# Patient Record
Sex: MALE | Race: BLACK OR AFRICAN AMERICAN | Employment: OTHER | URBAN - METROPOLITAN AREA
[De-identification: names, ages, dates, MRNs, and addresses within clinical notes are randomized per-mention and may not be internally consistent; named-entity substitution may affect disease eponyms.]

---

## 2021-06-12 ENCOUNTER — HOSPITAL ENCOUNTER (EMERGENCY)
Age: 61
Discharge: HOME | End: 2021-06-12
Attending: STUDENT IN AN ORGANIZED HEALTH CARE EDUCATION/TRAINING PROGRAM
Payer: MEDICARE

## 2021-06-12 VITALS
SYSTOLIC BLOOD PRESSURE: 137 MMHG | WEIGHT: 190 LBS | HEART RATE: 98 BPM | BODY MASS INDEX: 25.18 KG/M2 | OXYGEN SATURATION: 100 % | TEMPERATURE: 98.6 F | DIASTOLIC BLOOD PRESSURE: 91 MMHG | RESPIRATION RATE: 18 BRPM | HEIGHT: 73 IN

## 2021-06-12 DIAGNOSIS — F10.11 HISTORY OF ALCOHOL ABUSE: Primary | ICD-10-CM

## 2021-06-12 LAB
ALBUMIN SERPL-MCNC: 4.5 G/DL (ref 3.4–5)
ALP BLD-CCNC: 82 U/L (ref 40–129)
ALT SERPL-CCNC: 24 U/L (ref 10–40)
AMORPHOUS: ABNORMAL /HPF
ANION GAP SERPL CALCULATED.3IONS-SCNC: 14 MMOL/L (ref 3–16)
AST SERPL-CCNC: 52 U/L (ref 15–37)
BACTERIA: ABNORMAL /HPF
BASOPHILS ABSOLUTE: 0.1 K/UL (ref 0–0.2)
BASOPHILS RELATIVE PERCENT: 1.2 %
BILIRUB SERPL-MCNC: 0.5 MG/DL (ref 0–1)
BILIRUBIN DIRECT: <0.2 MG/DL (ref 0–0.3)
BILIRUBIN URINE: NEGATIVE
BILIRUBIN, INDIRECT: ABNORMAL MG/DL (ref 0–1)
BLOOD, URINE: NEGATIVE
BUN BLDV-MCNC: 4 MG/DL (ref 7–20)
CALCIUM SERPL-MCNC: 9.3 MG/DL (ref 8.3–10.6)
CHLORIDE BLD-SCNC: 95 MMOL/L (ref 99–110)
CLARITY: CLEAR
CO2: 24 MMOL/L (ref 21–32)
COLOR: YELLOW
CREAT SERPL-MCNC: 0.8 MG/DL (ref 0.8–1.3)
EKG ATRIAL RATE: 97 BPM
EKG DIAGNOSIS: NORMAL
EKG P AXIS: 67 DEGREES
EKG P-R INTERVAL: 202 MS
EKG Q-T INTERVAL: 328 MS
EKG QRS DURATION: 70 MS
EKG QTC CALCULATION (BAZETT): 416 MS
EKG R AXIS: 52 DEGREES
EKG T AXIS: 63 DEGREES
EKG VENTRICULAR RATE: 97 BPM
EOSINOPHILS ABSOLUTE: 0.2 K/UL (ref 0–0.6)
EOSINOPHILS RELATIVE PERCENT: 3.2 %
EPITHELIAL CELLS, UA: ABNORMAL /HPF (ref 0–5)
ETHANOL: 15 MG/DL (ref 0–0.08)
GFR AFRICAN AMERICAN: >60
GFR NON-AFRICAN AMERICAN: >60
GLUCOSE BLD-MCNC: 106 MG/DL (ref 70–99)
GLUCOSE URINE: NEGATIVE MG/DL
HCT VFR BLD CALC: 41.9 % (ref 40.5–52.5)
HEMOGLOBIN: 14.1 G/DL (ref 13.5–17.5)
KETONES, URINE: ABNORMAL MG/DL
LEUKOCYTE ESTERASE, URINE: NEGATIVE
LYMPHOCYTES ABSOLUTE: 1.9 K/UL (ref 1–5.1)
LYMPHOCYTES RELATIVE PERCENT: 35.2 %
MCH RBC QN AUTO: 37.2 PG (ref 26–34)
MCHC RBC AUTO-ENTMCNC: 33.8 G/DL (ref 31–36)
MCV RBC AUTO: 110.1 FL (ref 80–100)
MICROSCOPIC EXAMINATION: YES
MONOCYTES ABSOLUTE: 0.6 K/UL (ref 0–1.3)
MONOCYTES RELATIVE PERCENT: 11.9 %
NEUTROPHILS ABSOLUTE: 2.6 K/UL (ref 1.7–7.7)
NEUTROPHILS RELATIVE PERCENT: 48.5 %
NITRITE, URINE: NEGATIVE
PDW BLD-RTO: 14.8 % (ref 12.4–15.4)
PH UA: 5.5 (ref 5–8)
PLATELET # BLD: 240 K/UL (ref 135–450)
PMV BLD AUTO: 7.1 FL (ref 5–10.5)
POTASSIUM REFLEX MAGNESIUM: 4.3 MMOL/L (ref 3.5–5.1)
PRO-BNP: 6 PG/ML (ref 0–124)
PROTEIN UA: ABNORMAL MG/DL
RBC # BLD: 3.8 M/UL (ref 4.2–5.9)
RBC UA: ABNORMAL /HPF (ref 0–4)
SODIUM BLD-SCNC: 133 MMOL/L (ref 136–145)
SPECIFIC GRAVITY UA: 1.02 (ref 1–1.03)
TOTAL PROTEIN: 8.2 G/DL (ref 6.4–8.2)
TROPONIN: <0.01 NG/ML
URINE TYPE: ABNORMAL
UROBILINOGEN, URINE: 1 E.U./DL
WBC # BLD: 5.3 K/UL (ref 4–11)
WBC UA: ABNORMAL /HPF (ref 0–5)

## 2021-06-12 PROCEDURE — 82077 ASSAY SPEC XCP UR&BREATH IA: CPT

## 2021-06-12 PROCEDURE — 85025 COMPLETE CBC W/AUTO DIFF WBC: CPT

## 2021-06-12 PROCEDURE — 93005 ELECTROCARDIOGRAM TRACING: CPT | Performed by: PHYSICIAN ASSISTANT

## 2021-06-12 PROCEDURE — 6370000000 HC RX 637 (ALT 250 FOR IP): Performed by: PHYSICIAN ASSISTANT

## 2021-06-12 PROCEDURE — 83880 ASSAY OF NATRIURETIC PEPTIDE: CPT

## 2021-06-12 PROCEDURE — 84484 ASSAY OF TROPONIN QUANT: CPT

## 2021-06-12 PROCEDURE — 99284 EMERGENCY DEPT VISIT MOD MDM: CPT

## 2021-06-12 PROCEDURE — 2580000003 HC RX 258: Performed by: PHYSICIAN ASSISTANT

## 2021-06-12 PROCEDURE — 80076 HEPATIC FUNCTION PANEL: CPT

## 2021-06-12 PROCEDURE — 81001 URINALYSIS AUTO W/SCOPE: CPT

## 2021-06-12 PROCEDURE — 80048 BASIC METABOLIC PNL TOTAL CA: CPT

## 2021-06-12 RX ORDER — 0.9 % SODIUM CHLORIDE 0.9 %
1000 INTRAVENOUS SOLUTION INTRAVENOUS ONCE
Status: COMPLETED | OUTPATIENT
Start: 2021-06-12 | End: 2021-06-12

## 2021-06-12 RX ORDER — M-VIT,TX,IRON,MINS/CALC/FOLIC 27MG-0.4MG
1 TABLET ORAL ONCE
Status: COMPLETED | OUTPATIENT
Start: 2021-06-12 | End: 2021-06-12

## 2021-06-12 RX ADMIN — SODIUM CHLORIDE 1000 ML: 0.9 INJECTION, SOLUTION INTRAVENOUS at 10:30

## 2021-06-12 RX ADMIN — Medication 1 TABLET: at 11:13

## 2021-06-12 ASSESSMENT — ENCOUNTER SYMPTOMS
SHORTNESS OF BREATH: 0
NAUSEA: 0
DIARRHEA: 0
ABDOMINAL PAIN: 0
CONSTIPATION: 0
VOMITING: 0
BACK PAIN: 0
CHEST TIGHTNESS: 0
COLOR CHANGE: 0

## 2021-06-12 NOTE — ED PROVIDER NOTES
ED Attending Attestation Note     Date of evaluation: 6/12/2021    This patient was seen by the advance practice provider. I have seen and examined the patient, agree with the workup, evaluation, management and diagnosis. The care plan has been discussed. I have reviewed the ECG and concur with the FEROZ's interpretation. My assessment reveals 44-year-old male with a history of daily alcohol use for several years and a history of low sodium who presents with increased fatigue as reported by family members. Patient has no complaints and states that if it were not for the urging of his family he would not have presented to the emergency department, does not appear to be in acute withdrawal, vital signs are reassuring. We will plan to obtain basic laboratory work to assess electrolytes and continue to closely monitor patient. Patient's laboratory work notable for a sodium of 133, patient received IV fluids while in the emergency department and reports improvements, at this time I feel he is safe for discharge home with outpatient follow-up.      Yeny Williamson MD  06/12/21 1107

## 2021-06-12 NOTE — ED PROVIDER NOTES
confusion and hallucinations. The patient is not nervous/anxious. Past Medical, Surgical, Family, and Social History     He has a past medical history of Alcohol abuse and Disc degeneration, lumbar. He has no past surgical history on file. His family history is not on file. He reports that he has been smoking. He has never used smokeless tobacco. He reports current alcohol use. He reports that he does not use drugs. Medications     Discharge Medication List as of 6/12/2021 11:54 AM      CONTINUE these medications which have NOT CHANGED    Details   traMADol (ULTRAM) 50 MG tablet Take 1 tablet by mouth 4 times daily as needed for Pain., Disp-24 tablet, R-0Print      sildenafil (VIAGRA) 100 MG tablet Take 0.5 tablets by mouth as needed for Erectile Dysfunction. , Disp-6 tablet, R-2             Allergies     He has No Known Allergies. Physical Exam     INITIAL VITALS: BP: (!) 137/91, Temp: 98.6 °F (37 °C), Pulse: 98, Resp: 18, SpO2: 100 %  Physical Exam  Vitals and nursing note reviewed. Constitutional:       Appearance: Normal appearance. HENT:      Head: Normocephalic. Eyes:      Extraocular Movements: Extraocular movements intact. Pupils: Pupils are equal, round, and reactive to light. Cardiovascular:      Rate and Rhythm: Normal rate and regular rhythm. Pulses: Normal pulses. Heart sounds: Normal heart sounds. No murmur heard. No friction rub. No gallop. Pulmonary:      Effort: Pulmonary effort is normal. No respiratory distress. Breath sounds: Normal breath sounds. Abdominal:      Tenderness: There is no abdominal tenderness. Musculoskeletal:         General: Normal range of motion. Right lower leg: No edema. Left lower leg: No edema. Skin:     General: Skin is warm. Coloration: Skin is not jaundiced. Neurological:      General: No focal deficit present. Mental Status: He is alert.    Psychiatric:         Mood and Affect: Mood normal. Behavior: Behavior normal.         Diagnostic Results     EKG   Interpreted in conjunction with emergency department physician Kiah Petit MD  Rhythm: normal sinus   Rate: normal  Axis: normal  Ectopy: none  Conduction: normal  ST Segments: normal, J-point elevation in leads V2 through V4, no reciprocal changes  T Waves: normal  Q Waves:none  Clinical Impression: Normal sinus rhythm, no previous for comparison    RADIOLOGY:  No orders to display     LABS:   Results for orders placed or performed during the hospital encounter of 06/12/21   CBC Auto Differential   Result Value Ref Range    WBC 5.3 4.0 - 11.0 K/uL    RBC 3.80 (L) 4.20 - 5.90 M/uL    Hemoglobin 14.1 13.5 - 17.5 g/dL    Hematocrit 41.9 40.5 - 52.5 %    .1 (H) 80.0 - 100.0 fL    MCH 37.2 (H) 26.0 - 34.0 pg    MCHC 33.8 31.0 - 36.0 g/dL    RDW 14.8 12.4 - 15.4 %    Platelets 570 701 - 352 K/uL    MPV 7.1 5.0 - 10.5 fL    Neutrophils % 48.5 %    Lymphocytes % 35.2 %    Monocytes % 11.9 %    Eosinophils % 3.2 %    Basophils % 1.2 %    Neutrophils Absolute 2.6 1.7 - 7.7 K/uL    Lymphocytes Absolute 1.9 1.0 - 5.1 K/uL    Monocytes Absolute 0.6 0.0 - 1.3 K/uL    Eosinophils Absolute 0.2 0.0 - 0.6 K/uL    Basophils Absolute 0.1 0.0 - 0.2 K/uL   Basic Metabolic Panel w/ Reflex to MG   Result Value Ref Range    Sodium 133 (L) 136 - 145 mmol/L    Potassium reflex Magnesium 4.3 3.5 - 5.1 mmol/L    Chloride 95 (L) 99 - 110 mmol/L    CO2 24 21 - 32 mmol/L    Anion Gap 14 3 - 16    Glucose 106 (H) 70 - 99 mg/dL    BUN 4 (L) 7 - 20 mg/dL    CREATININE 0.8 0.8 - 1.3 mg/dL    GFR Non-African American >60 >60    GFR African American >60 >60    Calcium 9.3 8.3 - 10.6 mg/dL   Troponin   Result Value Ref Range    Troponin <0.01 <0.01 ng/mL   Brain Natriuretic Peptide   Result Value Ref Range    Pro-BNP 6 0 - 124 pg/mL   Urinalysis, reflex to microscopic   Result Value Ref Range    Color, UA Yellow Straw/Yellow    Clarity, UA Clear Clear    Glucose, Ur Negative Negative mg/dL    Bilirubin Urine Negative Negative    Ketones, Urine TRACE (A) Negative mg/dL    Specific Gravity, UA 1.025 1.005 - 1.030    Blood, Urine Negative Negative    pH, UA 5.5 5.0 - 8.0    Protein, UA TRACE (A) Negative mg/dL    Urobilinogen, Urine 1.0 <2.0 E.U./dL    Nitrite, Urine Negative Negative    Leukocyte Esterase, Urine Negative Negative    Microscopic Examination YES     Urine Type NotGiven    Hepatic Function Panel   Result Value Ref Range    Total Protein 8.2 6.4 - 8.2 g/dL    Albumin 4.5 3.4 - 5.0 g/dL    Alkaline Phosphatase 82 40 - 129 U/L    ALT 24 10 - 40 U/L    AST 52 (H) 15 - 37 U/L    Total Bilirubin 0.5 0.0 - 1.0 mg/dL    Bilirubin, Direct <0.2 0.0 - 0.3 mg/dL    Bilirubin, Indirect see below 0.0 - 1.0 mg/dL   Ethanol   Result Value Ref Range    Ethanol Lvl 15 mg/dL   Microscopic Urinalysis   Result Value Ref Range    WBC, UA 0-2 0 - 5 /HPF    RBC, UA 0-2 0 - 4 /HPF    Epithelial Cells, UA 0-1 0 - 5 /HPF    Bacteria, UA 3+ (A) None Seen /HPF    Amorphous, UA 2+ /HPF   EKG 12 Lead   Result Value Ref Range    Ventricular Rate 97 BPM    Atrial Rate 97 BPM    P-R Interval 202 ms    QRS Duration 70 ms    Q-T Interval 328 ms    QTc Calculation (Bazett) 416 ms    P Axis 67 degrees    R Axis 52 degrees    T Axis 63 degrees    Diagnosis       EKG performed in ER and to be interpreted by ER physician. Confirmed by MD, ER ([id number]),  Shannan Rosenberg ([phone]) on 6/12/2021 10:33:06 AM       ED BEDSIDE ULTRASOUND:  None    RECENT VITALS:  BP: (!) 137/91, Temp: 98.6 °F (37 °C), Pulse: 98, Resp: 18, SpO2: 100 %     Procedures   None    ED Course     Nursing Notes, Past Medical Hx,Past Surgical Hx, Social Hx, Allergies, and Family Hx were reviewed.     The patient was given the following medications:  Orders Placed This Encounter   Medications    0.9 % sodium chloride bolus    DISCONTD: sodium chloride 0.9 % 50 mL with folic acid 1 mg, thiamine 100 mg    therapeutic multivitamin-minerals 1 tablet CONSULTS:  None    MEDICAL DECISION MAKING / ASSESSMENT / PLAN     Tu Hilton is a 64 y.o. male presenting to the emergency department to have his electrolytes checked. He has a history of hyponatremia secondary to alcohol abuse. He drinks about 12 beers as well as 1/5 of liquor a day. Last drink was shortly prior to presenting to the ED. He had no evidence of alcohol withdrawal during our evaluation. Vitals were stable, he was afebrile, with no evidence of clinical intoxication. Family members were concerned that he has been more tired lately but he has been asymptomatic. Renal panel showed a sodium of 133. He was given a 1 L fluid bolus of normal saline. He is otherwise taking p.o., and drink apple juice while in the emergency department. He was given an oral rally pack as well. Electrolytes otherwise unremarkable on renal panel. He denies any chest pain, but based off of reported concerns from family members, cardiac work-up was also initiated including EKG, single troponin as well as BNP. EKG showed normal sinus rhythm with J-point elevation in V2 through V4, no acute changes to indicate ischemia or infarction. Single troponin was normal, he does not clinically appear fluid overloaded with a normal proBNP. Denies any reported infections, fevers or chills, with no leukocytosis, normal urinalysis, no recent viral illness, cough, nasal congestions or exposure to COVID-19 during the current pandemic. At this time, felt that patient was appropriate for discharge. He does not want any help for his alcohol abuse, but was given resources for outpatient management if he does change his mind. He understands that if he chooses to stop drinking alcohol in the future than he should do this under medical supervision. Patient is in agreement with this plan and understand strict return precautions and follow-up instructions. This patient was also evaluated by the attending physician.

## 2021-11-23 ENCOUNTER — APPOINTMENT (OUTPATIENT)
Dept: GENERAL RADIOLOGY | Age: 61
End: 2021-11-23
Payer: MEDICARE

## 2021-11-23 ENCOUNTER — APPOINTMENT (OUTPATIENT)
Dept: CT IMAGING | Age: 61
End: 2021-11-23
Payer: MEDICARE

## 2021-11-23 ENCOUNTER — HOSPITAL ENCOUNTER (EMERGENCY)
Age: 61
Discharge: HOME | End: 2021-11-23
Attending: EMERGENCY MEDICINE
Payer: MEDICARE

## 2021-11-23 VITALS
DIASTOLIC BLOOD PRESSURE: 96 MMHG | OXYGEN SATURATION: 100 % | TEMPERATURE: 97.9 F | HEART RATE: 89 BPM | RESPIRATION RATE: 20 BRPM | SYSTOLIC BLOOD PRESSURE: 145 MMHG

## 2021-11-23 DIAGNOSIS — R19.09 SWELLING OF INGUINAL REGION: ICD-10-CM

## 2021-11-23 DIAGNOSIS — S20.213A BILATERAL CONTUSION OF RIBS: Primary | ICD-10-CM

## 2021-11-23 LAB
ANION GAP SERPL CALCULATED.3IONS-SCNC: 14 MMOL/L (ref 3–16)
BASOPHILS ABSOLUTE: 0 K/UL (ref 0–0.2)
BASOPHILS RELATIVE PERCENT: 0.9 %
BUN BLDV-MCNC: 2 MG/DL (ref 7–20)
CALCIUM SERPL-MCNC: 9.2 MG/DL (ref 8.3–10.6)
CHLORIDE BLD-SCNC: 98 MMOL/L (ref 99–110)
CO2: 24 MMOL/L (ref 21–32)
CREAT SERPL-MCNC: 0.7 MG/DL (ref 0.8–1.3)
EOSINOPHILS ABSOLUTE: 0.1 K/UL (ref 0–0.6)
EOSINOPHILS RELATIVE PERCENT: 3.3 %
GFR AFRICAN AMERICAN: >60
GFR NON-AFRICAN AMERICAN: >60
GLUCOSE BLD-MCNC: 89 MG/DL (ref 70–99)
HCT VFR BLD CALC: 36.9 % (ref 40.5–52.5)
HEMOGLOBIN: 12.7 G/DL (ref 13.5–17.5)
LYMPHOCYTES ABSOLUTE: 1.5 K/UL (ref 1–5.1)
LYMPHOCYTES RELATIVE PERCENT: 42.1 %
MACROCYTES: ABNORMAL
MCH RBC QN AUTO: 38 PG (ref 26–34)
MCHC RBC AUTO-ENTMCNC: 34.3 G/DL (ref 31–36)
MCV RBC AUTO: 110.8 FL (ref 80–100)
MONOCYTES ABSOLUTE: 0.5 K/UL (ref 0–1.3)
MONOCYTES RELATIVE PERCENT: 15.1 %
NEUTROPHILS ABSOLUTE: 1.3 K/UL (ref 1.7–7.7)
NEUTROPHILS RELATIVE PERCENT: 38.6 %
PDW BLD-RTO: 16.7 % (ref 12.4–15.4)
PLATELET # BLD: 243 K/UL (ref 135–450)
PMV BLD AUTO: 7.2 FL (ref 5–10.5)
POLYCHROMASIA: ABNORMAL
POTASSIUM REFLEX MAGNESIUM: 4.1 MMOL/L (ref 3.5–5.1)
RBC # BLD: 3.33 M/UL (ref 4.2–5.9)
SLIDE REVIEW: ABNORMAL
SODIUM BLD-SCNC: 136 MMOL/L (ref 136–145)
TEAR DROP CELLS: ABNORMAL
WBC # BLD: 3.5 K/UL (ref 4–11)

## 2021-11-23 PROCEDURE — 85025 COMPLETE CBC W/AUTO DIFF WBC: CPT

## 2021-11-23 PROCEDURE — 80048 BASIC METABOLIC PNL TOTAL CA: CPT

## 2021-11-23 PROCEDURE — 71046 X-RAY EXAM CHEST 2 VIEWS: CPT

## 2021-11-23 PROCEDURE — 6360000004 HC RX CONTRAST MEDICATION: Performed by: EMERGENCY MEDICINE

## 2021-11-23 PROCEDURE — 74177 CT ABD & PELVIS W/CONTRAST: CPT

## 2021-11-23 PROCEDURE — 99283 EMERGENCY DEPT VISIT LOW MDM: CPT

## 2021-11-23 RX ADMIN — IOPAMIDOL 80 ML: 755 INJECTION, SOLUTION INTRAVENOUS at 15:44

## 2021-11-23 ASSESSMENT — ENCOUNTER SYMPTOMS
ABDOMINAL PAIN: 0
VOMITING: 0
NAUSEA: 0
EYE REDNESS: 0
SHORTNESS OF BREATH: 0

## 2021-11-23 NOTE — ED PROVIDER NOTES
ED Attending Attestation Note     Date of evaluation: 11/23/2021    This patient was seen by the advance practice provider. I have seen and examined the patient, agree with the workup, evaluation, management and diagnosis. The care plan has been discussed. My assessment reveals a 77-year-old male who presents with chief complaint of abscess. Patient states he fell a few days ago and has some pain in his bilateral lower ribs and also a bump in his groin. Patient on exam with benign abdomen, breathing comfortably, normal heart and lungs,. On groin exam patient with firm swelling noted medial to the left inguinal region superior to the shaft of the penis. On ultrasound it is unclear if this is an abscess versus hematoma. CAT scan ordered.      Rafaela Birmingham MD  11/23/21 1395

## 2021-11-23 NOTE — ED PROVIDER NOTES
[address] ENCOUNTER          PHYSICIAN ASSISTANT NOTE       Date of evaluation: 11/23/2021    Chief Complaint     Abscess (pt states, \"I have a bump on my groin and my left side hurts from me fallin 4 or 5 days ago. \")      History of Present Illness     Luiz Pagan is a 64 y.o. male with PMH of HTN, alcohol abuse, MANNING who presents with groin swelling and pain. Patient reports that he fell in the dark in his basement 2 weeks ago. He states that he fell onto his chest and struck his head, caused a small cut to his right eyebrow that has since healed. He states that since then, he has had pain to bilateral ribs as well as noticed a swelling in his left groin region. This area is also painful. No pain to abdomen, testicles or scrotum. He denies associated fevers, chills, shortness of breath, nausea or vomiting, urinary or bowel changes. No hx of abscess to area. Review of Systems     Review of Systems   Constitutional: Negative for chills and fever. HENT: Negative for congestion. Eyes: Negative for redness. Respiratory: Negative for shortness of breath. Cardiovascular: Positive for chest pain (bilateral rib pain). Gastrointestinal: Negative for abdominal pain, nausea and vomiting. Genitourinary: Negative for difficulty urinating. Musculoskeletal: Negative for gait problem. Skin: Negative for wound. Neurological: Negative for dizziness. Psychiatric/Behavioral: The patient is not nervous/anxious. Past Medical, Surgical, Family, and Social History     He has a past medical history of Alcohol abuse and Disc degeneration, lumbar. He has no past surgical history on file. His family history is not on file. He reports that he has been smoking cigarettes. He has never used smokeless tobacco. He reports current alcohol use. He reports that he does not use drugs.     Medications     Current Discharge Medication List      CONTINUE these medications which have NOT CHANGED    Details   traMADol (ULTRAM) 50 MG tablet Take 1 tablet by mouth 4 times daily as needed for Pain. Qty: 24 tablet, Refills: 0    Associated Diagnoses: Lumbar disc disease      sildenafil (VIAGRA) 100 MG tablet Take 0.5 tablets by mouth as needed for Erectile Dysfunction. Qty: 6 tablet, Refills: 2    Associated Diagnoses: ED (erectile dysfunction)             Allergies     He has No Known Allergies. Physical Exam     INITIAL VITALS: BP: (!) 145/96,Temp: 97.9 °F (36.6 °C), Pulse: 89, Resp: 20, SpO2: 100 %   Physical Exam  Vitals and nursing note reviewed. Constitutional:       General: He is not in acute distress. HENT:      Head: Normocephalic and atraumatic. Mouth/Throat:      Mouth: Mucous membranes are moist.   Eyes:      Extraocular Movements: Extraocular movements intact. Conjunctiva/sclera: Conjunctivae normal.   Cardiovascular:      Rate and Rhythm: Normal rate and regular rhythm. Pulmonary:      Effort: Pulmonary effort is normal. No respiratory distress. Breath sounds: Normal breath sounds. No wheezing, rhonchi or rales. Comments: Tenderness on palpation of the bilateral lateral ribs. No step off deformity. No overlying ecchymosis or crepitus. Chest:      Chest wall: Tenderness present. Abdominal:      General: Bowel sounds are normal. There is no distension. Palpations: Abdomen is soft. Tenderness: There is no abdominal tenderness. There is no guarding or rebound. Genitourinary:     Penis: Circumcised. No discharge. Testes:         Right: Tenderness or swelling not present. Right testis is descended. Left: Tenderness or swelling not present. Left testis is descended. Musculoskeletal:         General: No deformity. Cervical back: Neck supple. Skin:     General: Skin is warm and dry. Neurological:      Mental Status: He is alert and oriented to person, place, and time.    Psychiatric:         Mood and Affect: Mood normal.         Behavior: Behavior normal.         Diagnostic Results     RADIOLOGY:  CT ABDOMEN PELVIS W IV CONTRAST Additional Contrast? None   Final Result      1. There are 2 masses in the upper aspect of the left scrotal sac, both of which are slightly hyperdense. Given the history of trauma these could reflect hematomas but are indeterminate. Would recommend a follow-up scrotal ultrasound in 4-6 weeks to    assess evolution of these abnormalities. 2. Steatosis liver. XR CHEST (2 VW)   Final Result      1.  NO ACUTE CARDIOPULMONARY DISEASE          LABS:   Results for orders placed or performed during the hospital encounter of 11/23/21   CBC Auto Differential   Result Value Ref Range    WBC 3.5 (L) 4.0 - 11.0 K/uL    RBC 3.33 (L) 4.20 - 5.90 M/uL    Hemoglobin 12.7 (L) 13.5 - 17.5 g/dL    Hematocrit 36.9 (L) 40.5 - 52.5 %    .8 (H) 80.0 - 100.0 fL    MCH 38.0 (H) 26.0 - 34.0 pg    MCHC 34.3 31.0 - 36.0 g/dL    RDW 16.7 (H) 12.4 - 15.4 %    Platelets 583 004 - 778 K/uL    MPV 7.2 5.0 - 10.5 fL    SLIDE REVIEW see below     Neutrophils % 38.6 %    Lymphocytes % 42.1 %    Monocytes % 15.1 %    Eosinophils % 3.3 %    Basophils % 0.9 %    Neutrophils Absolute 1.3 (L) 1.7 - 7.7 K/uL    Lymphocytes Absolute 1.5 1.0 - 5.1 K/uL    Monocytes Absolute 0.5 0.0 - 1.3 K/uL    Eosinophils Absolute 0.1 0.0 - 0.6 K/uL    Basophils Absolute 0.0 0.0 - 0.2 K/uL    Macrocytes Occasional (A)     Polychromasia Occasional (A)     Tear Drop Cells Occasional (A)    Basic Metabolic Panel w/ Reflex to MG   Result Value Ref Range    Sodium 136 136 - 145 mmol/L    Potassium reflex Magnesium 4.1 3.5 - 5.1 mmol/L    Chloride 98 (L) 99 - 110 mmol/L    CO2 24 21 - 32 mmol/L    Anion Gap 14 3 - 16    Glucose 89 70 - 99 mg/dL    BUN 2 (L) 7 - 20 mg/dL    CREATININE 0.7 (L) 0.8 - 1.3 mg/dL    GFR Non-African American >60 >60    GFR African American >60 >60    Calcium 9.2 8.3 - 10.6 mg/dL       RECENT VITALS:  BP: (!) 145/96, Temp: 97.9 °F (36.6 °C), Pulse: 89,Resp: 20, SpO2: 100 %     Procedures       ED Course     Nursing Notes, Past Medical Hx, Past Surgical Hx, Social Hx, Allergies, and Family Hx were reviewed. The patient was given the followingmedications:  Orders Placed This Encounter   Medications    iopamidol (ISOVUE-370) 76 % injection 80 mL       CONSULTS:  None    MEDICAL DECISION MAKING / ASSESSMENT / Taylor Kendrick is a 64 y.o. male with PMH of HTN, alcohol abuse, MANNING who presents with groin swelling. Patient states that he had a mechanical fall 2 weeks ago, landing on his chest and striking his head. He denies LOC. He states that since then he has noticed swelling to his left groin region. This area is painful. It has not increased in size however. He denies any fevers or chills, nausea or vomiting. He does complain of some pain to bilateral ribs from the fall. On exam, he is hemodynamically stable, alert and oriented without focal neurologic deficits. Heart rhythm and rate are regular. Lungs are clear to auscultation. Tenderness on palpation of bilateral lateral ribs without step-off, ecchymosis or crepitus. Abdomen soft and nontender. Testicles are descended bilaterally without tenderness or swelling. There is an area of induration without fluctuance or overlying erythema to the left inguinal region. CXR without acute findings. Bedside ultrasound was performed by my attending provider but it was unclear if this represents an abscess vs hematoma. We will plan to obtain CT in further evaluation. Labs revealed stable hemoglobin, creatinine 0.7. CT revealed 2 masses in the upper aspect of the left scrotal sac, both of which are slightly hyperdense. Given the history of trauma, these could reflect hematomas but are indeterminate. As testicles are easily palpable without tenderness, I do not feel that scrotal ultrasound is indicated emergently.   These may represent hematomas as patient reports they did appear after he fell. No signs of infection or abscess. We will plan to have him  follow-up with Urology as an outpatient. He is stable for discharge although this time but with strict return precautions. Patient was agreeable and understanding to this plan of care. Prior to discharge, patient was ambulatory and PO tolerant. This patient was also evaluated by the attending physician. All care plans were discussed and agreed upon. Clinical Impression     1. Bilateral contusion of ribs    2. Swelling of inguinal region        Disposition     PATIENT REFERRED TO:  [facility]  [id number] [facility] [address]  [address] [facility] [address]  [phone]  Schedule an appointment as soon as possible for a visit       Wesley Closs, MD  [address] Emergency Department   [address]  [address] [facility] [address]  [phone]    If symptoms worsen      DISCHARGE MEDICATIONS:  Current Discharge Medication List          DISPOSITION  Discharge.        Rosalba Levy PA-C  11/23/21 1523

## 2025-07-11 ENCOUNTER — APPOINTMENT (OUTPATIENT)
Dept: GENERAL RADIOLOGY | Age: 65
End: 2025-07-11
Payer: MEDICARE

## 2025-07-11 ENCOUNTER — APPOINTMENT (OUTPATIENT)
Dept: CT IMAGING | Age: 65
End: 2025-07-11
Payer: MEDICARE

## 2025-07-11 ENCOUNTER — HOSPITAL ENCOUNTER (EMERGENCY)
Age: 65
Discharge: HOME | End: 2025-07-11
Attending: EMERGENCY MEDICINE
Payer: MEDICARE

## 2025-07-11 VITALS
BODY MASS INDEX: 20.48 KG/M2 | WEIGHT: 154.5 LBS | OXYGEN SATURATION: 100 % | SYSTOLIC BLOOD PRESSURE: 130 MMHG | HEART RATE: 95 BPM | TEMPERATURE: 98.5 F | DIASTOLIC BLOOD PRESSURE: 83 MMHG | HEIGHT: 73 IN | RESPIRATION RATE: 11 BRPM
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
## 2025-07-11 DIAGNOSIS — M25.511 ACUTE PAIN OF RIGHT SHOULDER: ICD-10-CM

## 2025-07-11 DIAGNOSIS — S01.81XA FACIAL LACERATION, INITIAL ENCOUNTER: Primary | ICD-10-CM

## 2025-07-11 LAB
ANION GAP SERPL CALCULATED.3IONS-SCNC: 12 MMOL/L (ref 3–16)
BASOPHILS # BLD: 0 K/UL (ref 0–0.2)
BASOPHILS NFR BLD: 0.4 %
BUN SERPL-MCNC: 3 MG/DL (ref 7–20)
CALCIUM SERPL-MCNC: 8.4 MG/DL (ref 8.3–10.6)
CHLORIDE SERPL-SCNC: 103 MMOL/L (ref 99–110)
CO2 SERPL-SCNC: 21 MMOL/L (ref 21–32)
CREAT SERPL-MCNC: 0.6 MG/DL (ref 0.8–1.3)
DEPRECATED RDW RBC AUTO: 20.2 % (ref 12.4–15.4)
EOSINOPHIL # BLD: 0.1 K/UL (ref 0–0.6)
EOSINOPHIL NFR BLD: 2.8 %
GFR SERPLBLD CREATININE-BSD FMLA CKD-EPI: >90 ML/MIN/{1.73_M2}
GLUCOSE SERPL-MCNC: 102 MG/DL (ref 70–99)
HCT VFR BLD AUTO: 33.6 % (ref 40.5–52.5)
HGB BLD-MCNC: 11.8 G/DL (ref 13.5–17.5)
LYMPHOCYTES # BLD: 1.9 K/UL (ref 1–5.1)
LYMPHOCYTES NFR BLD: 46.3 %
MCH RBC QN AUTO: 38 PG (ref 26–34)
MCHC RBC AUTO-ENTMCNC: 35 G/DL (ref 31–36)
MCV RBC AUTO: 108.5 FL (ref 80–100)
MONOCYTES # BLD: 0.4 K/UL (ref 0–1.3)
MONOCYTES NFR BLD: 9.8 %
NEUTROPHILS # BLD: 1.6 K/UL (ref 1.7–7.7)
NEUTROPHILS NFR BLD: 40.7 %
PLATELET # BLD AUTO: 258 K/UL (ref 135–450)
PMV BLD AUTO: 6.9 FL (ref 5–10.5)
POTASSIUM SERPL-SCNC: 3.8 MMOL/L (ref 3.5–5.1)
RBC # BLD AUTO: 3.09 M/UL (ref 4.2–5.9)
SODIUM SERPL-SCNC: 136 MMOL/L (ref 136–145)
WBC # BLD AUTO: 4 K/UL (ref 4–11)

## 2025-07-11 PROCEDURE — 85025 COMPLETE CBC W/AUTO DIFF WBC: CPT

## 2025-07-11 PROCEDURE — 72125 CT NECK SPINE W/O DYE: CPT

## 2025-07-11 PROCEDURE — 6360000002 HC RX W HCPCS: Performed by: EMERGENCY MEDICINE

## 2025-07-11 PROCEDURE — 70450 CT HEAD/BRAIN W/O DYE: CPT

## 2025-07-11 PROCEDURE — 73030 X-RAY EXAM OF SHOULDER: CPT

## 2025-07-11 PROCEDURE — 90715 TDAP VACCINE 7 YRS/> IM: CPT | Performed by: EMERGENCY MEDICINE

## 2025-07-11 PROCEDURE — 70486 CT MAXILLOFACIAL W/O DYE: CPT

## 2025-07-11 PROCEDURE — 96374 THER/PROPH/DIAG INJ IV PUSH: CPT

## 2025-07-11 PROCEDURE — 2580000003 HC RX 258: Performed by: EMERGENCY MEDICINE

## 2025-07-11 PROCEDURE — 99284 EMERGENCY DEPT VISIT MOD MDM: CPT

## 2025-07-11 PROCEDURE — 96361 HYDRATE IV INFUSION ADD-ON: CPT

## 2025-07-11 PROCEDURE — 12013 RPR F/E/E/N/L/M 2.6-5.0 CM: CPT

## 2025-07-11 PROCEDURE — 90471 IMMUNIZATION ADMIN: CPT | Performed by: EMERGENCY MEDICINE

## 2025-07-11 PROCEDURE — 80048 BASIC METABOLIC PNL TOTAL CA: CPT

## 2025-07-11 RX ORDER — KETOROLAC TROMETHAMINE 30 MG/ML
15 INJECTION, SOLUTION INTRAMUSCULAR; INTRAVENOUS ONCE
Status: COMPLETED | OUTPATIENT
Start: 2025-07-11 | End: 2025-07-11

## 2025-07-11 RX ORDER — LIDOCAINE HYDROCHLORIDE AND EPINEPHRINE 10; 10 MG/ML; UG/ML
20 INJECTION, SOLUTION INFILTRATION; PERINEURAL ONCE
Status: COMPLETED | OUTPATIENT
Start: 2025-07-11 | End: 2025-07-11

## 2025-07-11 RX ORDER — SODIUM CHLORIDE, SODIUM LACTATE, POTASSIUM CHLORIDE, AND CALCIUM CHLORIDE .6; .31; .03; .02 G/100ML; G/100ML; G/100ML; G/100ML
1000 INJECTION, SOLUTION INTRAVENOUS ONCE
Status: COMPLETED | OUTPATIENT
Start: 2025-07-11 | End: 2025-07-11

## 2025-07-11 RX ADMIN — SODIUM CHLORIDE, SODIUM LACTATE, POTASSIUM CHLORIDE, AND CALCIUM CHLORIDE 1000 ML: .6; .31; .03; .02 INJECTION, SOLUTION INTRAVENOUS at 18:47

## 2025-07-11 RX ADMIN — KETOROLAC TROMETHAMINE 15 MG: 30 INJECTION, SOLUTION INTRAMUSCULAR at 20:47

## 2025-07-11 RX ADMIN — LIDOCAINE HYDROCHLORIDE,EPINEPHRINE BITARTRATE 20 ML: 10; .01 INJECTION, SOLUTION INFILTRATION; PERINEURAL at 20:13

## 2025-07-11 RX ADMIN — TETANUS TOXOID, REDUCED DIPHTHERIA TOXOID AND ACELLULAR PERTUSSIS VACCINE, ADSORBED 0.5 ML: 5; 2.5; 8; 8; 2.5 SUSPENSION INTRAMUSCULAR at 18:44

## 2025-07-11 ASSESSMENT — PAIN DESCRIPTION - PAIN TYPE: TYPE: ACUTE PAIN

## 2025-07-11 ASSESSMENT — PAIN SCALES - GENERAL: PAINLEVEL_OUTOF10: 3

## 2025-07-11 ASSESSMENT — PAIN DESCRIPTION - LOCATION: LOCATION: FACE;SHOULDER

## 2025-07-11 ASSESSMENT — PAIN - FUNCTIONAL ASSESSMENT: PAIN_FUNCTIONAL_ASSESSMENT: 0-10

## 2025-07-11 ASSESSMENT — PAIN DESCRIPTION - ORIENTATION: ORIENTATION: RIGHT

## 2025-07-11 ASSESSMENT — PAIN DESCRIPTION - DESCRIPTORS: DESCRIPTORS: ACHING

## 2025-07-12 NOTE — ED PROVIDER NOTES
THE [facility]  EMERGENCY DEPARTMENT ENCOUNTER          ATTENDING PHYSICIAN NOTE       Date of evaluation: 7/11/2025    Chief Complaint     Fall (Pt was walking, tripped and fell striking face. Pt is a daily drinker. Reports drinking today. Denies LOC. Unsure how long he was outside for. Has had at least 1 bottle of wine today. )      History of Present Illness     Krzysztof Hwang is a 65 y.o. male who presents with complaint of fall.  This was reportedly a trip and fall while walking.  Patient reports he has been drinking today.  Does not think he lost consciousness.  Complains of some chronic pain in the back of his neck when he looks up, maybe a little bit worse today.  Complains of pain in the right side of his face where he struck the ground.  Denies chest or abdominal pain.  Does complain of pain in his left shoulder, primarily on the lateral aspect.    ASSESSMENT / PLAN  (MEDICAL DECISION MAKING)     INITIAL VITALS: BP: (!) 141/88, Temp: 98.5 °F (36.9 °C), Pulse: (!) 112, Respirations: 14, SpO2: 100 %      Krzysztof Hwang is a 65 y.o. male who presents after a fall.  He appears mildly intoxicated today.  CT of the head is unremarkable for acute hemorrhage.  CT cervical spine and CT facial bones are without acute fracture.  Laceration above the eyebrow was repaired.  Tetanus was updated.  Given Toradol for pain.  We did check basic labs given his tachycardia, does appear likely a bit dehydrated, was given IV fluid with resolution of his tachycardia.  He is able to ambulate steadily through the emergency department.  He will be going home with a sober ride.    Is this patient to be included in the SEP-1 core measure? No Exclusion criteria - the patient is NOT to be included for SEP-1 Core Measure due to: Infection is not suspected    Medical Decision Making  Amount and/or Complexity of Data Reviewed  Labs: ordered.  Radiology: ordered.    Risk  Prescription drug management.            Clinical  - 1.3 K/uL    Eosinophils Absolute 0.1 0.0 - 0.6 K/uL    Basophils Absolute 0.0 0.0 - 0.2 K/uL   BMP w/ Reflex to MG   Result Value Ref Range    Sodium 136 136 - 145 mmol/L    Potassium reflex Magnesium 3.8 3.5 - 5.1 mmol/L    Chloride 103 99 - 110 mmol/L    CO2 21 21 - 32 mmol/L    Anion Gap 12 3 - 16    Glucose 102 (H) 70 - 99 mg/dL    BUN 3 (L) 7 - 20 mg/dL    Creatinine 0.6 (L) 0.8 - 1.3 mg/dL    Est, Glom Filt Rate >90 >60    Calcium 8.4 8.3 - 10.6 mg/dL     EKG       ED BEDSIDE ULTRASOUND:  No results found.    MOST RECENT VITALS:  BP: 130/83,Temp: 98.5 °F (36.9 °C), Pulse: 95, Respirations: 11, SpO2: 100 %     Procedures     Lac Repair    Date/Time: 7/11/2025 10:25 PM    Performed by: Lester Berrios MD  Authorized by: Lester Berrios MD    Consent:     Consent obtained:  Verbal    Consent given by:  Patient    Risks discussed:  Infection and pain  Anesthesia:     Anesthesia method:  Local infiltration    Local anesthetic:  Lidocaine 1% WITH epi  Laceration details:     Location:  Face    Face location:  R eyebrow    Length (cm):  3  Treatment:     Area cleansed with:  Saline    Amount of cleaning:  Standard    Irrigation solution:  Sterile saline    Irrigation method:  Syringe    Debridement:  Minimal    Undermining:  None  Skin repair:     Repair method:  Sutures    Suture size:  5-0    Suture material:  Fast-absorbing gut    Suture technique:  Simple interrupted    Number of sutures:  6  Approximation:     Approximation:  Close  Repair type:     Repair type:  Simple  Post-procedure details:     Dressing:  Open (no dressing)    Procedure completion:  Tolerated well, no immediate complications        ED Course     Nursing Notes, Past Medical Hx, Past Surgical Hx, Social Hx,Allergies, and Family Hx were reviewed.         The patient was given the following medications:  Orders Placed This Encounter   Medications    tetanus-diphth-acell pertussis (BOOSTRIX) injection 0.5 mL

## 2025-07-12 NOTE — DISCHARGE INSTRUCTIONS
Return to emergency department for worsening symptoms or other concerns.  You have 6 stitches in your eyebrow, which should fall out on their own in 5 to 7 days.  You can apply some antibiotic ointment daily to them and wash daily with soap and water.  The other abrasions on your face you can wash daily with soap and water and similarly apply some antibiotic ointment or Aquaphor ointment to, and they should heal up on their own.  Take Tylenol and ibuprofen for pain at home.

## 2025-07-12 NOTE — ED NOTES
Pt discharged to home. Discharge instructions reviewed with patient. All questions answered, no concerns noted. Pt encouraged to return to emergency department if they have any new or worsening symptoms. Pt alert and oriented, resp even and unlabored, skin natural in color, no signs of acute distress.             Jeannette Marie, RN  07/11/25 6451